# Patient Record
Sex: FEMALE | Race: WHITE | NOT HISPANIC OR LATINO | URBAN - NONMETROPOLITAN AREA
[De-identification: names, ages, dates, MRNs, and addresses within clinical notes are randomized per-mention and may not be internally consistent; named-entity substitution may affect disease eponyms.]

---

## 2017-12-11 PROBLEM — H04.123: Noted: 2017-12-11

## 2017-12-11 PROBLEM — H18.51: Noted: 2017-12-11

## 2017-12-11 PROBLEM — H18.423: Noted: 2017-12-11

## 2017-12-11 PROBLEM — Z96.1: Noted: 2017-12-11

## 2017-12-11 PROBLEM — H35.363: Noted: 2017-12-11

## 2019-04-19 ENCOUNTER — IMPORTED ENCOUNTER (OUTPATIENT)
Dept: URBAN - NONMETROPOLITAN AREA CLINIC 1 | Facility: CLINIC | Age: 74
End: 2019-04-19

## 2019-04-19 PROCEDURE — 92014 COMPRE OPH EXAM EST PT 1/>: CPT

## 2019-04-19 NOTE — PATIENT DISCUSSION
s/p PCIOL-Stable PCIOL s/p YAG Caps OU.-Monitor. FUCHS:.-  Discussed findings of exam in detail with the patient. -  discussed the risk of corneal edema with vision loss and the importance of monitoring this chronic disease.-  warned of worsening of disease with cataract surgery higher risk of persistant corneal edema after routine cataract surgery discussed and the need for subsequent DSEK or PK discussed. 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 SISSY-Explained SISSY and associated symptoms.-Recommend increasing Omega 3s.-Pt to begin artificial tears OU QID PRN. Pt will contact us if this does not provide relief. Consider punctal plugs in that case.; Dr's Notes: Pt. does not marlen well.

## 2019-11-18 ENCOUNTER — IMPORTED ENCOUNTER (OUTPATIENT)
Dept: URBAN - NONMETROPOLITAN AREA CLINIC 1 | Facility: CLINIC | Age: 74
End: 2019-11-18

## 2019-11-18 PROBLEM — H04.123: Noted: 2019-11-18

## 2019-11-18 PROBLEM — Z96.1: Noted: 2019-11-18

## 2019-11-18 PROBLEM — H10.45: Noted: 2019-11-18

## 2019-11-18 PROBLEM — H35.363: Noted: 2019-11-18

## 2019-11-18 PROBLEM — H18.423: Noted: 2019-11-18

## 2019-11-18 PROBLEM — H18.51: Noted: 2019-11-18

## 2019-11-18 PROCEDURE — 92012 INTRM OPH EXAM EST PATIENT: CPT

## 2019-11-18 NOTE — PATIENT DISCUSSION
allergic conj oseducte pt monitorstart tobradex 1 gtt tid os x 1wkrtc prns/p PCIOL-Stable PCIOL s/p YAG Caps OU.-Monitor. FUCHS:.-  Discussed findings of exam in detail with the patient. -  discussed the risk of corneal edema with vision loss and the importance of monitoring this chronic disease.-  warned of worsening of disease with cataract surgery higher risk of persistant corneal edema after routine cataract surgery discussed and the need for subsequent DSEK or PK discussed. 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 SISSY-Explained SISSY and associated symptoms.-Recommend increasing Omega 3s.-Pt to begin artificial tears OU QID PRN. Pt will contact us if this does not provide relief. Consider punctal plugs in that case.; Dr's Notes: Pt. does not marlen well.

## 2020-05-16 ENCOUNTER — IMPORTED ENCOUNTER (OUTPATIENT)
Dept: URBAN - NONMETROPOLITAN AREA CLINIC 1 | Facility: CLINIC | Age: 75
End: 2020-05-16

## 2020-05-16 PROBLEM — H35.363: Noted: 2020-05-16

## 2020-05-16 PROBLEM — H18.423: Noted: 2020-05-16

## 2020-05-16 PROBLEM — H18.51: Noted: 2020-05-16

## 2020-05-16 PROBLEM — Z96.1: Noted: 2020-05-16

## 2020-05-16 PROBLEM — H04.123: Noted: 2020-05-16

## 2020-05-16 PROCEDURE — 92014 COMPRE OPH EXAM EST PT 1/>: CPT

## 2020-05-16 NOTE — PATIENT DISCUSSION
s/p PCIOL-Stable PCIOL s/p YAG Caps OU.-Monitor for changesFUCHS:.-  Discussed findings of exam in detail with the patient. -  discussed the risk of corneal edema with vision loss and the importance of monitoring this chronic disease.-  warned of worsening of disease with cataract surgery higher risk of persistant corneal edema after routine cataract surgery discussed and the need for subsequent DSEK or PK discussed. 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 SISSY-Explained SISSY and associated symptoms.-Recommend increasing Omega 3s.-Pt to begin artificial tears OU QID PRN. Pt will contact us if this does not provide relief. Consider punctal plugs in that case.; Dr's Notes: Pt. does not marlen well.

## 2021-06-17 ENCOUNTER — IMPORTED ENCOUNTER (OUTPATIENT)
Dept: URBAN - NONMETROPOLITAN AREA CLINIC 1 | Facility: CLINIC | Age: 76
End: 2021-06-17

## 2021-06-17 ENCOUNTER — PREPPED CHART (OUTPATIENT)
Dept: RURAL CLINIC 1 | Facility: CLINIC | Age: 76
End: 2021-06-17

## 2021-06-17 PROCEDURE — 92015 DETERMINE REFRACTIVE STATE: CPT

## 2021-06-17 PROCEDURE — 92014 COMPRE OPH EXAM EST PT 1/>: CPT

## 2021-06-17 NOTE — PATIENT DISCUSSION
Discussed the condition. Will continue to observe. Instructed the patient to call if any changes in the vision or the symptoms discussed.

## 2022-04-01 ENCOUNTER — EMERGENCY VISIT (OUTPATIENT)
Dept: RURAL CLINIC 1 | Facility: CLINIC | Age: 77
End: 2022-04-01

## 2022-04-01 DIAGNOSIS — H00.11: ICD-10-CM

## 2022-04-01 PROCEDURE — 99213 OFFICE O/P EST LOW 20 MIN: CPT

## 2022-04-01 ASSESSMENT — TONOMETRY
OD_IOP_MMHG: 13
OS_IOP_MMHG: 17
OD_IOP_MMHG: 17
OS_IOP_MMHG: 13

## 2022-04-01 ASSESSMENT — VISUAL ACUITY
OS_CC: 20/40
OU_CC: 20/30
OD_SC: 20/40-2
OD_CC: 20/40
OS_SC: 20/25

## 2022-04-09 ASSESSMENT — VISUAL ACUITY
OS_CC: 20/25
OU_CC: 20/25-
OS_CC: 20/30
OD_CC: 20/30
OD_CC: J2
OS_CC: J1
OS_SC: 20/40
OD_CC: 20/30
OD_CC: J1
OS_CC: 20/30
OD_CC: 20/30
OD_SC: 20/40
OS_CC: J2

## 2022-04-09 ASSESSMENT — TONOMETRY
OD_IOP_MMHG: 17
OS_IOP_MMHG: 17
OD_IOP_MMHG: 16
OS_IOP_MMHG: 15
OS_IOP_MMHG: 16
OD_IOP_MMHG: 17

## 2022-08-31 ENCOUNTER — ESTABLISHED PATIENT (OUTPATIENT)
Dept: RURAL CLINIC 1 | Facility: CLINIC | Age: 77
End: 2022-08-31

## 2022-08-31 DIAGNOSIS — Z96.1: ICD-10-CM

## 2022-08-31 DIAGNOSIS — H18.423: ICD-10-CM

## 2022-08-31 DIAGNOSIS — H04.123: ICD-10-CM

## 2022-08-31 DIAGNOSIS — H18.513: ICD-10-CM

## 2022-08-31 DIAGNOSIS — H35.363: ICD-10-CM

## 2022-08-31 PROCEDURE — 92014 COMPRE OPH EXAM EST PT 1/>: CPT

## 2022-08-31 PROCEDURE — 92015 DETERMINE REFRACTIVE STATE: CPT

## 2022-08-31 ASSESSMENT — VISUAL ACUITY
OU_CC: 20/30
OD_CC: 20/25-1
OS_CC: 20/30-1
OD_CC: 20/30
OU_CC: 20/25
OS_CC: 20/30

## 2022-08-31 ASSESSMENT — TONOMETRY
OS_IOP_MMHG: 14
OD_IOP_MMHG: 14

## 2024-02-07 ENCOUNTER — EMERGENCY VISIT (OUTPATIENT)
Dept: RURAL CLINIC 1 | Facility: CLINIC | Age: 79
End: 2024-02-07

## 2024-02-07 DIAGNOSIS — H10.013: ICD-10-CM

## 2024-02-07 DIAGNOSIS — H04.123: ICD-10-CM

## 2024-02-07 DIAGNOSIS — H18.513: ICD-10-CM

## 2024-02-07 PROCEDURE — 92012 INTRM OPH EXAM EST PATIENT: CPT

## 2024-02-07 ASSESSMENT — VISUAL ACUITY
OS_SC: 20/40
OU_SC: 20/40
OD_SC: 20/40-1

## 2024-05-07 ENCOUNTER — ESTABLISHED PATIENT (OUTPATIENT)
Dept: RURAL CLINIC 1 | Facility: CLINIC | Age: 79
End: 2024-05-07

## 2024-05-07 DIAGNOSIS — H04.123: ICD-10-CM

## 2024-05-07 DIAGNOSIS — Z96.1: ICD-10-CM

## 2024-05-07 DIAGNOSIS — H35.363: ICD-10-CM

## 2024-05-07 DIAGNOSIS — H18.513: ICD-10-CM

## 2024-05-07 DIAGNOSIS — H18.423: ICD-10-CM

## 2024-05-07 PROCEDURE — 92014 COMPRE OPH EXAM EST PT 1/>: CPT

## 2024-05-07 PROCEDURE — 92134 CPTRZ OPH DX IMG PST SGM RTA: CPT

## 2024-05-07 ASSESSMENT — TONOMETRY
OD_IOP_MMHG: 15
OS_IOP_MMHG: 15

## 2024-05-07 ASSESSMENT — VISUAL ACUITY
OS_CC: 20/40
OD_CC: 20/40
OS_PH: 20/40
OD_PH: 20/40
OU_CC: 20/30

## 2024-09-16 ENCOUNTER — EMERGENCY VISIT (OUTPATIENT)
Dept: RURAL CLINIC 1 | Facility: CLINIC | Age: 79
End: 2024-09-16

## 2024-09-16 DIAGNOSIS — Z96.1: ICD-10-CM

## 2024-09-16 DIAGNOSIS — H18.423: ICD-10-CM

## 2024-09-16 DIAGNOSIS — H04.123: ICD-10-CM

## 2024-09-16 DIAGNOSIS — H00.14: ICD-10-CM

## 2024-09-16 DIAGNOSIS — H18.513: ICD-10-CM

## 2024-09-16 DIAGNOSIS — H35.363: ICD-10-CM

## 2024-09-16 PROCEDURE — 99213 OFFICE O/P EST LOW 20 MIN: CPT

## 2025-05-09 ENCOUNTER — COMPREHENSIVE EXAM (OUTPATIENT)
Age: 80
End: 2025-05-09

## 2025-05-09 DIAGNOSIS — H04.123: ICD-10-CM

## 2025-05-09 DIAGNOSIS — H35.363: ICD-10-CM

## 2025-05-09 DIAGNOSIS — H18.513: ICD-10-CM

## 2025-05-09 DIAGNOSIS — H18.423: ICD-10-CM

## 2025-05-09 DIAGNOSIS — Z96.1: ICD-10-CM

## 2025-05-09 PROCEDURE — 92014 COMPRE OPH EXAM EST PT 1/>: CPT

## 2025-05-09 PROCEDURE — 92134 CPTRZ OPH DX IMG PST SGM RTA: CPT
